# Patient Record
Sex: MALE | Race: WHITE | NOT HISPANIC OR LATINO
[De-identification: names, ages, dates, MRNs, and addresses within clinical notes are randomized per-mention and may not be internally consistent; named-entity substitution may affect disease eponyms.]

---

## 2021-01-07 ENCOUNTER — APPOINTMENT (OUTPATIENT)
Dept: ORTHOPEDIC SURGERY | Facility: CLINIC | Age: 21
End: 2021-01-07
Payer: COMMERCIAL

## 2021-01-07 VITALS — RESPIRATION RATE: 16 BRPM | HEIGHT: 70 IN | WEIGHT: 165 LBS | BODY MASS INDEX: 23.62 KG/M2

## 2021-01-07 DIAGNOSIS — Z78.9 OTHER SPECIFIED HEALTH STATUS: ICD-10-CM

## 2021-01-07 PROBLEM — Z00.00 ENCOUNTER FOR PREVENTIVE HEALTH EXAMINATION: Status: ACTIVE | Noted: 2021-01-07

## 2021-01-07 PROCEDURE — 99072 ADDL SUPL MATRL&STAF TM PHE: CPT

## 2021-01-07 PROCEDURE — 99203 OFFICE O/P NEW LOW 30 MIN: CPT

## 2021-01-07 PROCEDURE — 73110 X-RAY EXAM OF WRIST: CPT | Mod: LT

## 2021-01-11 ENCOUNTER — OUTPATIENT (OUTPATIENT)
Dept: OUTPATIENT SERVICES | Facility: HOSPITAL | Age: 21
LOS: 1 days | Discharge: HOME | End: 2021-01-11

## 2021-01-11 DIAGNOSIS — Z11.59 ENCOUNTER FOR SCREENING FOR OTHER VIRAL DISEASES: ICD-10-CM

## 2021-01-13 ENCOUNTER — TRANSCRIPTION ENCOUNTER (OUTPATIENT)
Age: 21
End: 2021-01-13

## 2021-01-14 ENCOUNTER — OUTPATIENT (OUTPATIENT)
Dept: OUTPATIENT SERVICES | Facility: HOSPITAL | Age: 21
LOS: 1 days | Discharge: HOME | End: 2021-01-14

## 2021-01-14 ENCOUNTER — APPOINTMENT (OUTPATIENT)
Dept: ORTHOPEDIC SURGERY | Facility: AMBULATORY SURGERY CENTER | Age: 21
End: 2021-01-14
Payer: COMMERCIAL

## 2021-01-14 PROCEDURE — 25609 OPTX DST RD XART FX/EP SEP3+: CPT | Mod: LT

## 2021-01-25 ENCOUNTER — APPOINTMENT (OUTPATIENT)
Dept: ORTHOPEDIC SURGERY | Facility: CLINIC | Age: 21
End: 2021-01-25
Payer: COMMERCIAL

## 2021-01-25 PROCEDURE — 73110 X-RAY EXAM OF WRIST: CPT | Mod: LT

## 2021-01-25 PROCEDURE — 99024 POSTOP FOLLOW-UP VISIT: CPT

## 2021-01-25 RX ORDER — OXYCODONE AND ACETAMINOPHEN 5; 325 MG/1; MG/1
5-325 TABLET ORAL
Qty: 30 | Refills: 0 | Status: DISCONTINUED | COMMUNITY
Start: 2021-01-13 | End: 2021-01-25

## 2021-01-25 RX ORDER — CEPHALEXIN 500 MG/1
500 CAPSULE ORAL 3 TIMES DAILY
Qty: 21 | Refills: 0 | Status: DISCONTINUED | COMMUNITY
Start: 2021-01-13 | End: 2021-01-25

## 2021-01-25 RX ORDER — OXYCODONE AND ACETAMINOPHEN 5; 325 MG/1; MG/1
5-325 TABLET ORAL
Qty: 20 | Refills: 0 | Status: DISCONTINUED | COMMUNITY
Start: 2021-01-14 | End: 2021-01-25

## 2021-02-26 ENCOUNTER — APPOINTMENT (OUTPATIENT)
Dept: ORTHOPEDIC SURGERY | Facility: CLINIC | Age: 21
End: 2021-02-26
Payer: COMMERCIAL

## 2021-02-26 DIAGNOSIS — S52.572D OTHER INTRAARTICULAR FRACTURE OF LOWER END OF LEFT RADIUS, SUBSEQUENT ENCOUNTER FOR CLOSED FRACTURE WITH ROUTINE HEALING: ICD-10-CM

## 2021-02-26 PROCEDURE — 99024 POSTOP FOLLOW-UP VISIT: CPT

## 2021-02-26 PROCEDURE — 73110 X-RAY EXAM OF WRIST: CPT | Mod: LT

## 2021-04-23 ENCOUNTER — APPOINTMENT (OUTPATIENT)
Dept: ORTHOPEDIC SURGERY | Facility: CLINIC | Age: 21
End: 2021-04-23

## 2021-04-23 VITALS — BODY MASS INDEX: 23.62 KG/M2 | WEIGHT: 165 LBS | HEIGHT: 70 IN | RESPIRATION RATE: 16 BRPM

## 2024-08-09 ENCOUNTER — APPOINTMENT (OUTPATIENT)
Dept: ORTHOPEDIC SURGERY | Facility: CLINIC | Age: 24
End: 2024-08-09

## 2024-08-09 PROBLEM — S69.81XA INJURY OF TRIANGULAR FIBROCARTILAGE COMPLEX (TFCC) OF RIGHT WRIST: Status: ACTIVE | Noted: 2024-08-09

## 2024-08-09 PROCEDURE — 73110 X-RAY EXAM OF WRIST: CPT | Mod: RT

## 2024-08-09 PROCEDURE — 99203 OFFICE O/P NEW LOW 30 MIN: CPT

## 2024-08-09 NOTE — DISCUSSION/SUMMARY
[de-identified] : Patient has a right wrist injury/TFCC injury.  At this time I like an MRI to rule out a TFCC tear which may require surgery as patient has had this pain since 1 month despite rest.  Patient given a cock-up wrist brace however I instructed him to purchase a wrist widget as this is more personalized to his injury.  Patient should continue to rest/modify activity based on pain.  Call 2 to 3 days after the MRI to go over results and follow-up in approximately 1 month but sooner based on results if needed.  Patient agrees to above plan all questions were answered today

## 2024-08-09 NOTE — PHYSICAL EXAM
[de-identified] : Physical exam of the right wrist: -No erythema, ecchymosis, edema.  Skin intact -Tenderness over the TFCC and distal ulna -Patient has mildly limited extension of the wrist with pain, some pain with radial and ulnar deviation -Radial pulse +2, capillary refill less than 2 seconds, sensation intact to light touch

## 2024-08-09 NOTE — HISTORY OF PRESENT ILLNESS
[de-identified] : 24-year-old male presents for right wrist injury.  Right-hand-dominant.  Patient was running 1 month ago and he fell on the right hand.  After few days the pain improved however the past 3 to 4 weeks the pain has been persistent.  Pain hurts with certain range of motion and if he is pushing off the hand with any weight.  Patient does not have pain at rest.  Has not tried thing for pain relief.  No past injuries or surgeries to the area.

## 2024-08-09 NOTE — DATA REVIEWED
[FreeTextEntry1] : X-ray images were obtained at the office today.  AP, lateral, oblique views of the right wrist reveal no acute fractures, discussions, bony abnormalities

## 2024-09-09 ENCOUNTER — APPOINTMENT (OUTPATIENT)
Dept: ORTHOPEDIC SURGERY | Facility: CLINIC | Age: 24
End: 2024-09-09
Payer: COMMERCIAL

## 2024-09-09 DIAGNOSIS — S69.81XA OTHER SPECIFIED INJURIES OF RIGHT WRIST, HAND AND FINGER(S), INITIAL ENCOUNTER: ICD-10-CM

## 2024-09-09 PROCEDURE — 99202 OFFICE O/P NEW SF 15 MIN: CPT

## 2024-09-09 NOTE — ASSESSMENT
[FreeTextEntry1] : Patient is a right-sided TFCC tear.  The plan will be wrist widget bracing.  With the application of Coban in the office he had improvement in symptoms.  He will see us back on an as-needed basis.  The natural history of this condition was discussed with the patient.  Possibility of arthroscopy was discussed.

## 2024-09-09 NOTE — HISTORY OF PRESENT ILLNESS
[de-identified] : 24-year-old male pain discomfort in his right wrist.  Comes in today for evaluation.  He is still feeling the same way.  Is been several weeks since injury.  He did have x-rays done and MRI done.

## 2024-09-09 NOTE — DATA REVIEWED
[FreeTextEntry1] : Radiographs 3 views of the right wrist reviewed showing no fracture dislocation no destructive lesions.  MRI report of the right wrist is reviewed documenting TFCC tear

## 2024-09-09 NOTE — PHYSICAL EXAM
[de-identified] : Patient is good range of motion of the right hand and wrist.  Patient has tenderness to palpation on the ulnar side of the wrist there is no erythema ecchymoses or abrasions.  No instability at the DRUJ.